# Patient Record
Sex: MALE | Race: WHITE | Employment: FULL TIME | URBAN - METROPOLITAN AREA
[De-identification: names, ages, dates, MRNs, and addresses within clinical notes are randomized per-mention and may not be internally consistent; named-entity substitution may affect disease eponyms.]

---

## 2019-04-24 ENCOUNTER — HOSPITAL ENCOUNTER (EMERGENCY)
Facility: CLINIC | Age: 44
Discharge: HOME | End: 2019-04-25
Attending: EMERGENCY MEDICINE | Admitting: EMERGENCY MEDICINE

## 2019-04-24 DIAGNOSIS — R04.0 EPISTAXIS: ICD-10-CM

## 2019-04-24 DIAGNOSIS — I10 ASYMPTOMATIC HYPERTENSION: ICD-10-CM

## 2019-04-24 PROCEDURE — 99284 EMERGENCY DEPT VISIT MOD MDM: CPT | Mod: 25

## 2019-04-24 PROCEDURE — 96374 THER/PROPH/DIAG INJ IV PUSH: CPT

## 2019-04-24 PROCEDURE — 93005 ELECTROCARDIOGRAM TRACING: CPT

## 2019-04-24 NOTE — ED AVS SNAPSHOT
[facility] Emergency Department  [address]  [facility] [address]  Phone:  [phone]  Fax:  [phone]                                    Collins Haley   MRN: [id number]    Department:  [facility] Emergency Department   Date of Visit:  4/24/2019           After Visit Summary Signature Page    I have received my discharge instructions, and my questions have been answered. I have discussed any challenges I see with this plan with the nurse or doctor.    ..........................................................................................................................................  Patient/Patient Representative Signature      ..........................................................................................................................................  Patient Representative Print Name and Relationship to Patient    ..................................................               ................................................  Date                                   Time    ..........................................................................................................................................  Reviewed by Signature/Title    ...................................................              ..............................................  Date                                               Time          22EPIC Rev 08/18

## 2019-04-25 VITALS
SYSTOLIC BLOOD PRESSURE: 180 MMHG | DIASTOLIC BLOOD PRESSURE: 131 MMHG | TEMPERATURE: 99.5 F | OXYGEN SATURATION: 97 % | RESPIRATION RATE: 18 BRPM | HEART RATE: 73 BPM

## 2019-04-25 LAB
ANION GAP SERPL CALCULATED.3IONS-SCNC: 6 MMOL/L (ref 3–14)
BASOPHILS # BLD AUTO: 0.1 10E9/L (ref 0–0.2)
BASOPHILS NFR BLD AUTO: 0.6 %
BUN SERPL-MCNC: 22 MG/DL (ref 7–30)
CALCIUM SERPL-MCNC: 8.8 MG/DL (ref 8.5–10.1)
CHLORIDE SERPL-SCNC: 104 MMOL/L (ref 94–109)
CO2 SERPL-SCNC: 28 MMOL/L (ref 20–32)
CREAT SERPL-MCNC: 0.8 MG/DL (ref 0.66–1.25)
DIFFERENTIAL METHOD BLD: NORMAL
EOSINOPHIL # BLD AUTO: 0 10E9/L (ref 0–0.7)
EOSINOPHIL NFR BLD AUTO: 0 %
ERYTHROCYTE [DISTWIDTH] IN BLOOD BY AUTOMATED COUNT: 13 % (ref 10–15)
GFR SERPL CREATININE-BSD FRML MDRD: >90 ML/MIN/{1.73_M2}
GLUCOSE SERPL-MCNC: 91 MG/DL (ref 70–99)
HCT VFR BLD AUTO: 42.7 % (ref 40–53)
HGB BLD-MCNC: 14.3 G/DL (ref 13.3–17.7)
IMM GRANULOCYTES # BLD: 0 10E9/L (ref 0–0.4)
IMM GRANULOCYTES NFR BLD: 0.5 %
INTERPRETATION ECG - MUSE: NORMAL
LYMPHOCYTES # BLD AUTO: 1.6 10E9/L (ref 0.8–5.3)
LYMPHOCYTES NFR BLD AUTO: 20.8 %
MCH RBC QN AUTO: 31.9 PG (ref 26.5–33)
MCHC RBC AUTO-ENTMCNC: 33.5 G/DL (ref 31.5–36.5)
MCV RBC AUTO: 95 FL (ref 78–100)
MONOCYTES # BLD AUTO: 0.9 10E9/L (ref 0–1.3)
MONOCYTES NFR BLD AUTO: 10.9 %
NEUTROPHILS # BLD AUTO: 5.3 10E9/L (ref 1.6–8.3)
NEUTROPHILS NFR BLD AUTO: 67.2 %
NRBC # BLD AUTO: 0 10*3/UL
NRBC BLD AUTO-RTO: 0 /100
PLATELET # BLD AUTO: 182 10E9/L (ref 150–450)
POTASSIUM SERPL-SCNC: 3.4 MMOL/L (ref 3.4–5.3)
RBC # BLD AUTO: 4.48 10E12/L (ref 4.4–5.9)
SODIUM SERPL-SCNC: 138 MMOL/L (ref 133–144)
TROPONIN I SERPL-MCNC: <0.015 UG/L (ref 0–0.04)
WBC # BLD AUTO: 7.9 10E9/L (ref 4–11)

## 2019-04-25 PROCEDURE — 25000132 ZZH RX MED GY IP 250 OP 250 PS 637: Performed by: EMERGENCY MEDICINE

## 2019-04-25 PROCEDURE — 25000125 ZZHC RX 250: Performed by: EMERGENCY MEDICINE

## 2019-04-25 PROCEDURE — 80048 BASIC METABOLIC PNL TOTAL CA: CPT | Performed by: EMERGENCY MEDICINE

## 2019-04-25 PROCEDURE — 85025 COMPLETE CBC W/AUTO DIFF WBC: CPT | Performed by: EMERGENCY MEDICINE

## 2019-04-25 PROCEDURE — 84484 ASSAY OF TROPONIN QUANT: CPT | Performed by: EMERGENCY MEDICINE

## 2019-04-25 RX ORDER — METOPROLOL TARTRATE 25 MG/1
25 TABLET, FILM COATED ORAL ONCE
Status: COMPLETED | OUTPATIENT
Start: 2019-04-25 | End: 2019-04-25

## 2019-04-25 RX ORDER — METOPROLOL TARTRATE 25 MG/1
25 TABLET, FILM COATED ORAL 2 TIMES DAILY
Qty: 28 TABLET | Refills: 0 | Status: SHIPPED | OUTPATIENT
Start: 2019-04-25 | End: 2019-05-09

## 2019-04-25 RX ORDER — METOPROLOL TARTRATE 1 MG/ML
5 INJECTION, SOLUTION INTRAVENOUS ONCE
Status: COMPLETED | OUTPATIENT
Start: 2019-04-25 | End: 2019-04-25

## 2019-04-25 RX ADMIN — METOPROLOL TARTRATE 25 MG: 25 TABLET ORAL at 00:27

## 2019-04-25 RX ADMIN — METOPROLOL TARTRATE 5 MG: 5 INJECTION, SOLUTION INTRAVENOUS at 00:32

## 2019-04-25 ASSESSMENT — ENCOUNTER SYMPTOMS
HEADACHES: 0
RHINORRHEA: 0
SHORTNESS OF BREATH: 0

## 2019-04-25 NOTE — ED TRIAGE NOTES
Started with right side nose bleed at 5:30pm and then the left. Denies any injury or trauma to nose. Denies any blood thinners. ABCs intact.

## 2019-04-25 NOTE — ED PROVIDER NOTES
History     Chief Complaint:  Epistaxis     HPI   Collins Haley is a 43 year old male with a history of hypertension who presents to the Emergency Department today for evaluation of epistaxis. Patient reports that he noticed blood on the floor due to his bloody nose at 5:30 PM when he was leaving work. He tried unsuccessfully to stop the bleeding himself. The clamp has been on his nose for an hour and a half and he no longer feels like he is swallowing blood. He reports he does not usually get nosebleeds. He is not sick with a cold and has no congestion or rhinorrhea. He denies snorting drugs.  Patient's blood pressure is noted to be high here, and he states that he stopped his blood pressure medication 1 to 2 years ago without stating why.  He denies any chest pain, shortness of breath, headache, or vision changes.    Allergies:  No known drug allergies    Medications:    Multivitamin    Past Medical History:    Hypertension  Substance abuse    Past Surgical History:    History reviewed. No pertinent surgical history.    Family History:    History reviewed. No pertinent family history.     Social History:  Alcohol use: Yes  Marital Status:  Single [1]     Review of Systems   HENT: Positive for nosebleeds. Negative for congestion and rhinorrhea.    Eyes: Negative for visual disturbance.   Respiratory: Negative for shortness of breath.    Cardiovascular: Negative for chest pain.   Neurological: Negative for headaches.   All other systems reviewed and are negative.      Physical Exam     Patient Vitals for the past 24 hrs:   BP Temp Temp src Pulse Heart Rate Resp SpO2   04/25/19 0115 (!) 180/131 -- -- 73 -- -- 97 %   04/25/19 0100 (!) 195/127 -- -- 78 -- -- 97 %   04/25/19 0045 (!) 197/136 -- -- 75 -- -- 96 %   04/25/19 0030 (!) 210/142 -- -- 86 -- -- 99 %   04/25/19 0015 (!) 182/126 -- -- 82 -- -- 98 %   04/25/19 0000 (!) 211/138 -- -- -- -- -- --   04/24/19 2155 (!) 198/126 99.5  F (37.5  C) Oral 101 101 18 99  %       Physical Exam    Nursing note and vitals reviewed.  Constitutional: Cooperative.   HENT:   Mouth/Throat: Moist mucous membranes.   Evidence of recent bleeding from right nare with blood clot noted to septum.  No septal hematoma.  Eyes: EOMI, nonicteric sclera  Cardiovascular: Normal rate, regular rhythm, no murmurs, rubs, or gallops  Pulmonary/Chest: Effort normal and breath sounds normal. No respiratory distress. No wheezes. No rales.   Abdominal: Soft. Nontender, nondistended, no guarding or rigidity. BS present.   Musculoskeletal: Normal range of motion.   Neurological: Alert. Moves all extremities spontaneously.   Skin: Skin is warm and dry. No rash noted.   Psychiatric: Normal mood and affect.     Emergency Department Course     ECG (0:21:15):  Rate 81 bpm. WI interval 180. QRS duration 112. QT/QTc 384/446. P-R-T axes 12 2 35. Normal sinus rhythm. Nonspecific T wave abnormality. Abnormal ECG. Interpreted at 0026 by Luis Jasso MD.    Laboratory:  CBC: WNL (WBC 7.9, HGB 14.3, )  BMP: WNL (Creatinine 0.80)    Troponin I: <0.015    Interventions:  0027: Lopressor 25 MG PO  0032: Lopressor 5 MG IV    Emergency Department Course:  Past medical records, nursing notes, and vitals reviewed.  0003: I performed an exam of the patient and obtained history, as documented above.    IV inserted and blood drawn.    0129: I rechecked the patient. Explained findings to the patient.    Findings and plan explained to the Patient. Patient discharged home with instructions regarding supportive care, medications, and reasons to return. The importance of close follow-up was reviewed.      Impression & Plan      Medical Decision Making:  Collins Haley is a 43 year old male who presents for evaluation of epistaxis and elevated blood pressure.  There is history of hypertension in the past.  The workup here is negative and the patient does not have any clinical, laboratory, ecg or historical signs of end-organ  dysfunction.  There is no signs of hypertensive emergency or urgency.  At this time, this is considered asymptomatic hypertension.  Most likely etiology is nonadherence to antihypertensive medication.  Supportive outpatient management is therefore indicated with close follow-up of primary care physician for recheck of blood pressure and likely addition of a second agent.  Given data obtained here in ED, will initiate metoprolol for therapy at this time and encouraged serial blood pressure monitoring at home to aid primary in decision making regarding hypertension.  Regarding patient's epistaxis, it is controlled here with pressure only.  Will send patient home with nasal clamp and he was instructed on use.  Will avoid Afrin given his concurrent hypertension.  Discussed with patient that if has persistent bleeding, may require ENT evaluation.  He is discharged in stable condition.  All questions answered.    Diagnosis:    ICD-10-CM   1. Asymptomatic hypertension I10     Disposition:  discharged to home    Discharge Medications:     Medication List      Started    metoprolol tartrate 25 MG tablet  Commonly known as:  LOPRESSOR  25 mg, Oral, 2 TIMES DAILY          Emilee Thorne  4/24/2019   [facility] EMERGENCY DEPARTMENT  Scribe Disclosure:  I, Emilee Thorne, am serving as a scribe at 12:03 AM on 4/25/2019 to document services personally performed by Luis Jasso MD based on my observations and the provider's statements to me.        Luis Jasso MD  04/25/19 0180